# Patient Record
Sex: MALE | Race: WHITE | NOT HISPANIC OR LATINO | Employment: FULL TIME | URBAN - METROPOLITAN AREA
[De-identification: names, ages, dates, MRNs, and addresses within clinical notes are randomized per-mention and may not be internally consistent; named-entity substitution may affect disease eponyms.]

---

## 2022-08-30 ENCOUNTER — HOSPITAL ENCOUNTER (EMERGENCY)
Facility: HOSPITAL | Age: 22
Discharge: HOME | End: 2022-08-30
Attending: EMERGENCY MEDICINE
Payer: COMMERCIAL

## 2022-08-30 VITALS
BODY MASS INDEX: 28.7 KG/M2 | SYSTOLIC BLOOD PRESSURE: 121 MMHG | RESPIRATION RATE: 18 BRPM | HEART RATE: 72 BPM | OXYGEN SATURATION: 98 % | TEMPERATURE: 98.4 F | HEIGHT: 71 IN | DIASTOLIC BLOOD PRESSURE: 70 MMHG | WEIGHT: 205.03 LBS

## 2022-08-30 DIAGNOSIS — E86.0 DEHYDRATION: Primary | ICD-10-CM

## 2022-08-30 DIAGNOSIS — N39.0 UTI (URINARY TRACT INFECTION): ICD-10-CM

## 2022-08-30 LAB
ALBUMIN SERPL BCP-MCNC: 5.8 G/DL (ref 3.5–5)
ALP SERPL-CCNC: 67 U/L (ref 46–116)
ALT SERPL W P-5'-P-CCNC: 37 U/L (ref 12–78)
ANION GAP SERPL CALCULATED.3IONS-SCNC: 11 MMOL/L (ref 4–13)
ANION GAP SERPL CALCULATED.3IONS-SCNC: 14 MMOL/L (ref 4–13)
AST SERPL W P-5'-P-CCNC: 32 U/L (ref 5–45)
BACTERIA UR QL AUTO: ABNORMAL /HPF
BASOPHILS # BLD AUTO: 0.07 THOUSANDS/ΜL (ref 0–0.1)
BASOPHILS NFR BLD AUTO: 0 % (ref 0–1)
BILIRUB SERPL-MCNC: 1.3 MG/DL (ref 0.2–1)
BILIRUB UR QL STRIP: ABNORMAL
BUN SERPL-MCNC: 27 MG/DL (ref 5–25)
BUN SERPL-MCNC: 29 MG/DL (ref 5–25)
CALCIUM SERPL-MCNC: 10.9 MG/DL (ref 8.3–10.1)
CALCIUM SERPL-MCNC: 9.2 MG/DL (ref 8.3–10.1)
CARDIAC TROPONIN I PNL SERPL HS: 3 NG/L
CHLORIDE SERPL-SCNC: 100 MMOL/L (ref 96–108)
CHLORIDE SERPL-SCNC: 93 MMOL/L (ref 96–108)
CK MB SERPL-MCNC: 1.2 % (ref 0–2.5)
CK MB SERPL-MCNC: 5.7 NG/ML (ref 0–5)
CK SERPL-CCNC: 473 U/L (ref 39–308)
CLARITY UR: CLEAR
CO2 SERPL-SCNC: 27 MMOL/L (ref 21–32)
CO2 SERPL-SCNC: 29 MMOL/L (ref 21–32)
COLOR UR: YELLOW
CREAT SERPL-MCNC: 1.36 MG/DL (ref 0.6–1.3)
CREAT SERPL-MCNC: 1.81 MG/DL (ref 0.6–1.3)
EOSINOPHIL # BLD AUTO: 0.01 THOUSAND/ΜL (ref 0–0.61)
EOSINOPHIL NFR BLD AUTO: 0 % (ref 0–6)
ERYTHROCYTE [DISTWIDTH] IN BLOOD BY AUTOMATED COUNT: 12.3 % (ref 11.6–15.1)
FLUAV RNA RESP QL NAA+PROBE: NEGATIVE
FLUBV RNA RESP QL NAA+PROBE: NEGATIVE
GFR SERPL CREATININE-BSD FRML MDRD: 51 ML/MIN/1.73SQ M
GFR SERPL CREATININE-BSD FRML MDRD: 72 ML/MIN/1.73SQ M
GLUCOSE SERPL-MCNC: 111 MG/DL (ref 65–140)
GLUCOSE SERPL-MCNC: 124 MG/DL (ref 65–140)
GLUCOSE SERPL-MCNC: 97 MG/DL (ref 65–140)
GLUCOSE UR STRIP-MCNC: NEGATIVE MG/DL
HCT VFR BLD AUTO: 48.8 % (ref 36.5–49.3)
HGB BLD-MCNC: 16.8 G/DL (ref 12–17)
HGB UR QL STRIP.AUTO: NEGATIVE
HYALINE CASTS #/AREA URNS LPF: ABNORMAL /LPF
IMM GRANULOCYTES # BLD AUTO: 0.07 THOUSAND/UL (ref 0–0.2)
IMM GRANULOCYTES NFR BLD AUTO: 0 % (ref 0–2)
KETONES UR STRIP-MCNC: ABNORMAL MG/DL
LEUKOCYTE ESTERASE UR QL STRIP: NEGATIVE
LYMPHOCYTES # BLD AUTO: 0.97 THOUSANDS/ΜL (ref 0.6–4.47)
LYMPHOCYTES NFR BLD AUTO: 5 % (ref 14–44)
MCH RBC QN AUTO: 29.4 PG (ref 26.8–34.3)
MCHC RBC AUTO-ENTMCNC: 34.4 G/DL (ref 31.4–37.4)
MCV RBC AUTO: 85 FL (ref 82–98)
MONOCYTES # BLD AUTO: 0.92 THOUSAND/ΜL (ref 0.17–1.22)
MONOCYTES NFR BLD AUTO: 5 % (ref 4–12)
MUCOUS THREADS UR QL AUTO: ABNORMAL
NEUTROPHILS # BLD AUTO: 16.42 THOUSANDS/ΜL (ref 1.85–7.62)
NEUTS SEG NFR BLD AUTO: 90 % (ref 43–75)
NITRITE UR QL STRIP: POSITIVE
NON-SQ EPI CELLS URNS QL MICRO: ABNORMAL /HPF
NRBC BLD AUTO-RTO: 0 /100 WBCS
PH UR STRIP.AUTO: 6.5 [PH]
PLATELET # BLD AUTO: 413 THOUSANDS/UL (ref 149–390)
PMV BLD AUTO: 10.3 FL (ref 8.9–12.7)
POTASSIUM SERPL-SCNC: 4.2 MMOL/L (ref 3.5–5.3)
POTASSIUM SERPL-SCNC: 4.3 MMOL/L (ref 3.5–5.3)
PROT SERPL-MCNC: 9.6 G/DL (ref 6.4–8.4)
PROT UR STRIP-MCNC: ABNORMAL MG/DL
RBC # BLD AUTO: 5.72 MILLION/UL (ref 3.88–5.62)
RBC #/AREA URNS AUTO: ABNORMAL /HPF
RSV RNA RESP QL NAA+PROBE: NEGATIVE
SARS-COV-2 RNA RESP QL NAA+PROBE: NEGATIVE
SODIUM SERPL-SCNC: 136 MMOL/L (ref 135–147)
SODIUM SERPL-SCNC: 138 MMOL/L (ref 135–147)
SP GR UR STRIP.AUTO: 1.02 (ref 1–1.03)
URATE CRY URNS QL MICRO: ABNORMAL /HPF
UROBILINOGEN UR QL STRIP.AUTO: 0.2 E.U./DL
WBC # BLD AUTO: 18.46 THOUSAND/UL (ref 4.31–10.16)
WBC #/AREA URNS AUTO: ABNORMAL /HPF

## 2022-08-30 PROCEDURE — 96374 THER/PROPH/DIAG INJ IV PUSH: CPT

## 2022-08-30 PROCEDURE — 85025 COMPLETE CBC W/AUTO DIFF WBC: CPT

## 2022-08-30 PROCEDURE — 96375 TX/PRO/DX INJ NEW DRUG ADDON: CPT

## 2022-08-30 PROCEDURE — 82553 CREATINE MB FRACTION: CPT

## 2022-08-30 PROCEDURE — 82948 REAGENT STRIP/BLOOD GLUCOSE: CPT

## 2022-08-30 PROCEDURE — 36415 COLL VENOUS BLD VENIPUNCTURE: CPT

## 2022-08-30 PROCEDURE — 93005 ELECTROCARDIOGRAM TRACING: CPT

## 2022-08-30 PROCEDURE — 81001 URINALYSIS AUTO W/SCOPE: CPT

## 2022-08-30 PROCEDURE — 82550 ASSAY OF CK (CPK): CPT

## 2022-08-30 PROCEDURE — 99285 EMERGENCY DEPT VISIT HI MDM: CPT

## 2022-08-30 PROCEDURE — 99284 EMERGENCY DEPT VISIT MOD MDM: CPT

## 2022-08-30 PROCEDURE — 80048 BASIC METABOLIC PNL TOTAL CA: CPT

## 2022-08-30 PROCEDURE — 80053 COMPREHEN METABOLIC PANEL: CPT

## 2022-08-30 PROCEDURE — 84484 ASSAY OF TROPONIN QUANT: CPT

## 2022-08-30 PROCEDURE — 96361 HYDRATE IV INFUSION ADD-ON: CPT

## 2022-08-30 PROCEDURE — 0241U HB NFCT DS VIR RESP RNA 4 TRGT: CPT

## 2022-08-30 RX ORDER — PROMETHAZINE HYDROCHLORIDE 25 MG/ML
12.5 INJECTION, SOLUTION INTRAMUSCULAR; INTRAVENOUS ONCE
Status: COMPLETED | OUTPATIENT
Start: 2022-08-30 | End: 2022-08-30

## 2022-08-30 RX ORDER — CEPHALEXIN 250 MG/1
500 CAPSULE ORAL ONCE
Status: COMPLETED | OUTPATIENT
Start: 2022-08-30 | End: 2022-08-30

## 2022-08-30 RX ORDER — CEPHALEXIN 500 MG/1
500 CAPSULE ORAL EVERY 6 HOURS SCHEDULED
Qty: 20 CAPSULE | Refills: 0 | Status: SHIPPED | OUTPATIENT
Start: 2022-08-30 | End: 2022-09-04

## 2022-08-30 RX ORDER — ONDANSETRON 4 MG/1
4 TABLET, ORALLY DISINTEGRATING ORAL EVERY 6 HOURS PRN
Qty: 20 TABLET | Refills: 0 | Status: SHIPPED | OUTPATIENT
Start: 2022-08-30

## 2022-08-30 RX ORDER — ONDANSETRON 2 MG/ML
4 INJECTION INTRAMUSCULAR; INTRAVENOUS ONCE
Status: COMPLETED | OUTPATIENT
Start: 2022-08-30 | End: 2022-08-30

## 2022-08-30 RX ORDER — METOCLOPRAMIDE HYDROCHLORIDE 5 MG/ML
10 INJECTION INTRAMUSCULAR; INTRAVENOUS ONCE
Status: COMPLETED | OUTPATIENT
Start: 2022-08-30 | End: 2022-08-30

## 2022-08-30 RX ADMIN — CEPHALEXIN 500 MG: 250 CAPSULE ORAL at 22:44

## 2022-08-30 RX ADMIN — METOCLOPRAMIDE HYDROCHLORIDE 10 MG: 5 INJECTION INTRAMUSCULAR; INTRAVENOUS at 17:00

## 2022-08-30 RX ADMIN — SODIUM CHLORIDE 1000 ML: 0.9 INJECTION, SOLUTION INTRAVENOUS at 16:17

## 2022-08-30 RX ADMIN — SODIUM CHLORIDE 1000 ML: 0.9 INJECTION, SOLUTION INTRAVENOUS at 17:47

## 2022-08-30 RX ADMIN — PROMETHAZINE HYDROCHLORIDE 12.5 MG: 25 INJECTION INTRAMUSCULAR; INTRAVENOUS at 20:25

## 2022-08-30 RX ADMIN — ONDANSETRON 4 MG: 2 INJECTION INTRAMUSCULAR; INTRAVENOUS at 16:09

## 2022-08-30 NOTE — ED NOTES
Patient continues to vomit  Patient diaphoresis and shaking subsided for about 10 minutes before returning       Jamel Hope  08/30/22 1772

## 2022-08-30 NOTE — ED NOTES
Patient appears diaphoretic all over body and is shaking  Patient reports feeling both hot and cold  Provider notified        Ko Montague  08/30/22 4374

## 2022-08-30 NOTE — ED PROVIDER NOTES
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 and he started feeling very fatigued, hot and nauseous around 1:30 PM today  He admits to muscular cramping, headaches, abdominal discomfort, nausea and vomiting, chills  He states he has not urinated since  He admits to vomiting 5-6 times  He says he has been laying on the couch trying to drink water but states he has just been vomiting it up  Denies fever, runny nose, sore throat, cough, sneezing, chest pain, difficulty breathing, changes in BM, blood in urine that he has noticed  Patient admits to taking protein supplements for working out and drinking alcohol occasionally  History provided by:  Patient   used: No    Vomiting  Severity:  Moderate  Duration:  3 hours  Timing:  Intermittent  Number of daily episodes:  6  Quality:  Stomach contents  Progression:  Unchanged  Chronicity:  New  Recent urination:  Decreased  Ineffective treatments:  Liquids  Associated symptoms: chills, headaches and myalgias    Associated symptoms: no abdominal pain, no cough, no diarrhea, no fever, no sore throat and no URI        None       History reviewed  No pertinent past medical history  History reviewed  No pertinent surgical history  History reviewed  No pertinent family history  I have reviewed and agree with the history as documented  E-Cigarette/Vaping     E-Cigarette/Vaping Substances          Review of Systems   Constitutional: Positive for chills  Negative for fever  HENT: Negative for congestion and sore throat      Respiratory: Negative for cough, chest tightness and shortness of breath  Cardiovascular: Negative for chest pain  Gastrointestinal: Positive for vomiting  Negative for abdominal pain, diarrhea and nausea  Genitourinary: Positive for decreased urine volume  Negative for difficulty urinating, dysuria and hematuria  Musculoskeletal: Positive for myalgias  Negative for neck pain and neck stiffness  Skin: Negative for color change  Neurological: Positive for headaches  Negative for dizziness, weakness and light-headedness  Psychiatric/Behavioral: Negative for behavioral problems and sleep disturbance  All other systems reviewed and are negative  Physical Exam  Physical Exam  Vitals and nursing note reviewed  Constitutional:       General: He is awake  Appearance: Normal appearance  He is well-developed  Comments: Patient has the chills and is shaking  HENT:      Head: Normocephalic and atraumatic  Right Ear: External ear normal       Left Ear: External ear normal       Nose: Nose normal       Mouth/Throat:      Mouth: Mucous membranes are moist       Pharynx: Oropharynx is clear  No oropharyngeal exudate or posterior oropharyngeal erythema  Eyes:      General: No scleral icterus  Extraocular Movements: Extraocular movements intact  Conjunctiva/sclera: Conjunctivae normal       Pupils: Pupils are equal, round, and reactive to light  Cardiovascular:      Rate and Rhythm: Normal rate and regular rhythm  Heart sounds: Normal heart sounds, S1 normal and S2 normal  No murmur heard  No gallop  Pulmonary:      Effort: Pulmonary effort is normal       Breath sounds: Normal breath sounds and air entry  No wheezing, rhonchi or rales  Abdominal:      General: Abdomen is flat  Palpations: Abdomen is soft  Tenderness: There is no abdominal tenderness  There is no guarding or rebound  Musculoskeletal:         General: Normal range of motion        Cervical back: Full passive range of motion without pain, normal range of motion and neck supple  Right lower leg: No edema  Left lower leg: No edema  Skin:     General: Skin is warm and moist       Findings: No rash  Neurological:      General: No focal deficit present  Mental Status: He is alert and oriented to person, place, and time  Comments: Full strength and sensation in all 4 extremities    Psychiatric:         Attention and Perception: Attention and perception normal          Mood and Affect: Mood normal          Behavior: Behavior normal  Behavior is cooperative           Vital Signs  ED Triage Vitals   Temperature Pulse Respirations Blood Pressure SpO2   08/30/22 1601 08/30/22 1602 08/30/22 1602 08/30/22 1605 08/30/22 1602   98 4 °F (36 9 °C) 73 16 114/68 99 %      Temp Source Heart Rate Source Patient Position - Orthostatic VS BP Location FiO2 (%)   08/30/22 1601 08/30/22 1602 08/30/22 1605 08/30/22 1605 --   Oral Monitor Sitting Right arm       Pain Score       08/30/22 1618       5           Vitals:    08/30/22 1700 08/30/22 1730 08/30/22 1800 08/30/22 2100   BP: 130/78 123/65 118/67 121/70   Pulse: 78 70 66 72   Patient Position - Orthostatic VS:             Visual Acuity      ED Medications  Medications   ondansetron (ZOFRAN) injection 4 mg (4 mg Intravenous Given 8/30/22 1609)   sodium chloride 0 9 % bolus 1,000 mL (0 mL Intravenous Stopped 8/30/22 1747)   metoclopramide (REGLAN) injection 10 mg (10 mg Intravenous Given 8/30/22 1700)   sodium chloride 0 9 % bolus 1,000 mL (0 mL Intravenous Stopped 8/30/22 1847)   promethazine (PHENERGAN) injection 12 5 mg (12 5 mg Intravenous Given 8/30/22 2025)   cephalexin (KEFLEX) capsule 500 mg (500 mg Oral Given 8/30/22 2244)       Diagnostic Studies  Results Reviewed     Procedure Component Value Units Date/Time    Urine Microscopic [[id number]]  (Abnormal) Collected: 08/30/22 2145    Lab Status: Final result Specimen: Urine, Clean Catch Updated: 08/30/22 2214     RBC, UA None Seen /hpf      WBC, UA 0-1 /hpf Epithelial Cells None Seen /hpf      Bacteria, UA Moderate /hpf      Hyaline Casts, UA 4-10 /lpf      Uric Acid Abimbola, UA Occasional /hpf      MUCUS THREADS Moderate    Basic metabolic panel [[id number]]  (Abnormal) Collected: 08/30/22 2139    Lab Status: Final result Specimen: Blood from Arm, Right Updated: 08/30/22 2159     Sodium 138 mmol/L      Potassium 4 3 mmol/L      Chloride 100 mmol/L      CO2 27 mmol/L      ANION GAP 11 mmol/L      BUN 27 mg/dL      Creatinine 1 36 mg/dL      Glucose 111 mg/dL      Calcium 9 2 mg/dL      eGFR 72 ml/min/1 73sq m     Narrative:      National Kid[employer] Disease Foundation guidelines for Chronic Kidney Disease (CKD):     Stage 1 with normal or high GFR (GFR > 90 mL/min/1 73 square meters)    Stage 2 Mild CKD (GFR = 60-89 mL/min/1 73 square meters)    Stage 3A Moderate CKD (GFR = 45-59 mL/min/1 73 square meters)    Stage 3B Moderate CKD (GFR = 30-44 mL/min/1 73 square meters)    Stage 4 Severe CKD (GFR = 15-29 mL/min/1 73 square meters)    Stage 5 End Stage CKD (GFR <15 mL/min/1 73 square meters)  Note: GFR calculation is accurate only with a steady state creatinine    UA w Reflex to Microscopic w Reflex to Culture [[id number]]  (Abnormal) Collected: 08/30/22 2145    Lab Status: Final result Specimen: Urine, Clean Catch Updated: 08/30/22 2155     Color, UA Yellow     Clarity, UA Clear     Specific Gravity, UA 1 025     pH, UA 6 5     Leukocytes, UA Negative     Nitrite, UA Positive     Protein, UA 30 (1+) mg/dl      Glucose, UA Negative mg/dl      Ketones, UA 40 (2+) mg/dl      Urobilinogen, UA 0 2 E U /dl      Bilirubin, UA Small     Occult Blood, UA Negative    HS Troponin 0hr (reflex protocol) [[id number]]  (Normal) Collected: 08/30/22 1701    Lab Status: Final result Specimen: Blood from Arm, Right Updated: 08/30/22 1730     hs TnI 0hr 3 ng/L     FLU/RSV/COVID - if FLU/RSV clinically relevant [[id number]]  (Normal) Collected: 08/30/22 1615    Lab Status: Final result Specimen: Nares from Nose Updated: 08/30/22 1705     SARS-CoV-2 Negative     INFLUENZA A PCR Negative     INFLUENZA B PCR Negative     RSV PCR Negative    Narrative:      FOR PEDIATRIC PATIENTS - copy/paste COVID Guidelines URL to browser: [email or web address]    SARS-CoV-2 assay is a Nucleic Acid Amplification assay intended for the  qualitative detection of nucleic acid from SARS-CoV-2 in nasopharyngeal  swabs  Results are for the presumptive identification of SARS-CoV-2 RNA  Positive results are indicative of infection with SARS-CoV-2, the virus  causing COVID-19, but do not rule out bacterial infection or co-infection  with other viruses  Laboratories within the [place] and its  territories are required to report all positive results to the appropriate  public health authorities  Negative results do not preclude SARS-CoV-2  infection and should not be used as the sole basis for treatment or other  patient management decisions  Negative results must be combined with  clinical observations, patient history, and epidemiological information  This test has not been FDA cleared or approved  This test has been authorized by FDA under an Emergency Use Authorization  (EUA)  This test is only authorized for the duration of time the  declaration that circumstances exist justifying the authorization of the  emergency use of an in vitro diagnostic tests for detection of SARS-CoV-2  virus and/or diagnosis of COVID-19 infection under section 564(b)(1) of  the Act, 21 U  S C  532YUG-1(U)(2), unless the authorization is terminated  or revoked sooner  The test has been validated but independent review by FDA  and CLIA is pending  Test performed using [employer] Gene[employer]pert: This RT-PCR assay targets N2,  a region unique to SARS-CoV-2  A conserved region in the E-gene was chosen  for pan-Sarbecovirus detection which includes SARS-CoV-2      Comprehensive metabolic panel [[id number]]  (Abnormal) Collected: 08/30/22 1608    Lab Status: Final result Specimen: Blood from Arm, Right Updated: 08/30/22 1700     Sodium 136 mmol/L      Potassium 4 2 mmol/L      Chloride 93 mmol/L      CO2 29 mmol/L      ANION GAP 14 mmol/L      BUN 29 mg/dL      Creatinine 1 81 mg/dL      Glucose 124 mg/dL      Calcium 10 9 mg/dL      AST 32 U/L      ALT 37 U/L      Alkaline Phosphatase 67 U/L      Total Protein 9 6 g/dL      Albumin 5 8 g/dL      Total Bilirubin 1 30 mg/dL      eGFR 51 ml/min/1 73sq m     Narrative:      [place] guidelines for Chronic Kidney Disease (CKD):     Stage 1 with normal or high GFR (GFR > 90 mL/min/1 73 square meters)    Stage 2 Mild CKD (GFR = 60-89 mL/min/1 73 square meters)    Stage 3A Moderate CKD (GFR = 45-59 mL/min/1 73 square meters)    Stage 3B Moderate CKD (GFR = 30-44 mL/min/1 73 square meters)    Stage 4 Severe CKD (GFR = 15-29 mL/min/1 73 square meters)    Stage 5 End Stage CKD (GFR <15 mL/min/1 73 square meters)  Note: GFR calculation is accurate only with a steady state creatinine    CKMB [[id number]]  (Abnormal) Collected: 08/30/22 1608    Lab Status: Final result Specimen: Blood from Arm, Right Updated: 08/30/22 1700     CK-MB Index 1 2 %      CK-MB 5 7 ng/mL     CBC and differential [[id number]]  (Abnormal) Collected: 08/30/22 1608    Lab Status: Final result Specimen: Blood from Arm, Right Updated: 08/30/22 1659     WBC 18 46 Thousand/uL      RBC 5 72 Million/uL      Hemoglobin 16 8 g/dL      Hematocrit 48 8 %      MCV 85 fL      MCH 29 4 pg      MCHC 34 4 g/dL      RDW 12 3 %      MPV 10 3 fL      Platelets 077 Thousands/uL      nRBC 0 /100 WBCs      Neutrophils Relative 90 %      Immat GRANS % 0 %      Lymphocytes Relative 5 %      Monocytes Relative 5 %      Eosinophils Relative 0 %      Basophils Relative 0 %      Neutrophils Absolute 16 42 Thousands/µL      Immature Grans Absolute 0 07 Thousand/uL      Lymphocytes Absolute 0 97 Thousands/µL      Monocytes Absolute 0 92 Thousand/µL      Eosinophils Absolute 0 01 Thousand/µL      Basophils Absolute 0 07 Thousands/µL     Narrative: This is an appended report  These results have been appended to a previously verified report  CK (with reflex to MB) [[id number]]  (Abnormal) Collected: 08/30/22 1608    Lab Status: Final result Specimen: Blood from Arm, Right Updated: 08/30/22 1655     Total  U/L     Fingerstick Glucose (POCT) [[id number]]  (Normal) Collected: 08/30/22 1615    Lab Status: Final result Updated: 08/30/22 1616     POC Glucose 97 mg/dl                  No orders to display              Procedures  ECG 12 Lead Documentation Only    Date/Time: 8/30/2022 5:11 PM  Performed by: Eldon Cook PA-C  Authorized by: Eldon Cook PA-C     Indications / Diagnosis:  Ordered in triage   ECG reviewed: dr Waqas Segovia  Previous ECG:     Previous ECG:  Unavailable  Interpretation:     Interpretation: normal    Rate:     ECG rate:  66    ECG rate assessment: normal    Rhythm:     Rhythm: sinus rhythm    Comments:      HR 66 bpm  Normal sinus rhythm with no signs of acute ischemia, ectopy or arrhytmias             ED Course  ED Course as of 08/30/22 2355   Tue Aug 30, 2022   1700 Nurse reports patient still shaking, vomiting and sweating  Patient denies any other new symptoms  Will try reglan for nausea and add troponin    1746 Patient starting to feel better after 1 L NS  With elevated creatinine, will add another fluid bolus    2216 Patient feeling much better after 2 boluses of fluids and anti-nausea meds  He has been able to drink water and eat pretzels  Stable for discharge at this time                                SBIRT 20yo+    Flowsheet Row Most Recent Value   SBIRT (25 yo +)    In order to provide better care to our patients, we are screening all of our patients for alcohol and drug use  Would it be okay to ask you these screening questions?  Yes Filed at: 08/30/2022 1607   Initial Alcohol Screen: US AUDIT-C     1  How often do you have a drink containing alcohol? 0 Filed at: 08/30/2022 1607   2  How many drinks containing alcohol do you have on a typical day you are drinking? 0 Filed at: 08/30/2022 1607   3b  FEMALE Any Age, or MALE 65+: How often do you have 4 or more drinks on one occassion? 0 Filed at: 08/30/2022 1607   Audit-C Score 0 Filed at: 08/30/2022 1607   ALEX: How many times in the past year have you    Used an illegal drug or used a prescription medication for non-medical reasons? Never Filed at: 08/30/2022 1607                    MDM  Number of Diagnoses or Management Options  Dehydration: new and requires workup  UTI (urinary tract infection): new and requires workup  Diagnosis management comments: 20 y/o male here for dehydration since 1:30 PM today after working outside  Differential diagnosis: dehydration, heat exhaustion, rhabdomyolysis, electrolyte imbalance, metabolic disturbance   Assessment: dehydration, UTI  Plan: Patient give zofran and reglan with no relief of nausea  Had relief after phenergan and 2 boluses of normal saline  Creatinine noted to improve from 1 81 to 1 36  UTI noted on labs as well  Patient given keflex in ED and sent prescription for keflex and zofran He was told to continue to stay hydrated and rest  Was given care instructions for dehydration  He was given a script to get his BMP repeated in 24-48 hours to re-evaluate kidney function  Was instructed to schedule an appointment to f/u with his PCP  He  was given strict return to ER precautions both verbally and in discharge papers  Patient verbalized understanding and agrees with plan          Amount and/or Complexity of Data Reviewed  Clinical lab tests: ordered and reviewed    Risk of Complications, Morbidity, and/or Mortality  Presenting problems: moderate  Diagnostic procedures: low  Management options: low    Patient Progress  Patient progress: improved      Disposition  Final diagnoses:   Dehydration   UTI (urinary tract infection)     Time reflects when diagnosis was documented in both MDM as applicable and the Disposition within this note     Time User Action Codes Description Comment    8/30/2022 10:22 PM Alexia Earing Add [E86 0] Dehydration     8/30/2022 10:22 PM Alexia Earing Add [N39 0] UTI (urinary tract infection)       ED Disposition     ED Disposition   Discharge    Condition   Stable    Date/Time   Tue Aug 30, 2022 10:22 PM    Scottlynette Mccollum 405 discharge to home/self care  Follow-up Information     Follow up With Specialties Details Why Contact Info Additional Maribel Venecia 5885 Emergency Department Emergency Medicine Go to  if you develop chest pain, shortness of breath, difficulty breathing, confusion, cannot urinate or blood in urine, severe muscle cramping, difficulty walking, heart palpitations [address] [address]  [phone] [address] Emergency Department, [address] [place] [address], [facility]    PCP  Schedule an appointment as soon as possible for a visit              Discharge Medication List as of 8/30/2022 10:25 PM      START taking these medications    Details   cephalexin (KEFLEX) 500 mg capsule Take 1 capsule (500 mg total) by mouth every 6 (six) hours for 5 days, Starting Tue 8/30/2022, Until Sun 9/4/2022, Normal      ondansetron (Zofran ODT) 4 mg disintegrating tablet Take 1 tablet (4 mg total) by mouth every 6 (six) hours as needed for nausea or vomiting, Starting Tue 8/30/2022, Normal             Outpatient Discharge Orders   Basic metabolic panel   Standing Status: Future Standing Exp   Date: 08/30/23       PDMP Review     None          ED Provider  Electronically Signed by           Mera Moreno PA-C  08/30/22 2743

## 2022-08-31 LAB
ATRIAL RATE: 66 BPM
P AXIS: 83 DEGREES
PR INTERVAL: 154 MS
QRS AXIS: 91 DEGREES
QRSD INTERVAL: 96 MS
QT INTERVAL: 378 MS
QTC INTERVAL: 396 MS
T WAVE AXIS: 26 DEGREES
VENTRICULAR RATE: 66 BPM

## 2022-08-31 PROCEDURE — 93010 ELECTROCARDIOGRAM REPORT: CPT | Performed by: INTERNAL MEDICINE

## 2022-08-31 NOTE — DISCHARGE INSTRUCTIONS
Continue to stay well hydrated with fluids and electrolyte drinks like gatorade, powerade, liquid IV  Take 4 mg zofran under the tongue as needed for nausea  Get labwork (BMP) repeated in 24-48 hours  Schedule an appointment with the PCP for follow-up  Return to the ER if you develop chest pain, shortness of breath, difficulty breathing, confusion, cannot urinate or blood in urine, severe muscle cramping, difficulty walking, heart palpitations